# Patient Record
Sex: FEMALE | Race: WHITE | NOT HISPANIC OR LATINO | Employment: FULL TIME | URBAN - METROPOLITAN AREA
[De-identification: names, ages, dates, MRNs, and addresses within clinical notes are randomized per-mention and may not be internally consistent; named-entity substitution may affect disease eponyms.]

---

## 2017-01-19 ENCOUNTER — TELEPHONE (OUTPATIENT)
Dept: OBSTETRICS AND GYNECOLOGY | Facility: CLINIC | Age: 27
End: 2017-01-19

## 2017-01-19 DIAGNOSIS — R10.2 PELVIC PAIN IN FEMALE: Primary | ICD-10-CM

## 2017-01-19 NOTE — TELEPHONE ENCOUNTER
Emanuelmignondustin pt - pt had a mirena inserted 3 weeks ago and has been experiencing abdominal pain, she said it is a sharp pain more than cramping feeling and it is not consistent. It is on each side around her ovaries.

## 2017-01-19 NOTE — TELEPHONE ENCOUNTER
Pt reports sharp pelvic pain more on the right side but occurs on both sides.  Had Mirena inserted 3 weeks ago. She has not felt for the strings.  Scheduled US and appt with Dr. Castro. 1/24/17 @4280  US prep given.

## 2017-01-20 ENCOUNTER — TELEPHONE (OUTPATIENT)
Dept: SLEEP MEDICINE | Facility: CLINIC | Age: 27
End: 2017-01-20
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
## 2017-01-20 NOTE — TELEPHONE ENCOUNTER
----- Message from Bel Cobos sent at 1/18/2017  4:28 PM CST -----  _x  1st Request  _  2nd Request  _  3rd Request        Who: Patient     Why: Please call her she have questions about the botox injection.     What Number to Call Back: [phone]    When to Expect a call back: (Before the end of the day)   -- if call after 3:00 call back will be tomorrow.                      [phone]

## 2017-01-20 NOTE — TELEPHONE ENCOUNTER
I can assess for migraine and then treat migraine w Botox, but cannot treat TMJ w botox, technically.

## 2017-01-24 ENCOUNTER — OFFICE VISIT (OUTPATIENT)
Dept: OBSTETRICS AND GYNECOLOGY | Facility: CLINIC | Age: 27
End: 2017-01-24
Payer: COMMERCIAL

## 2017-01-24 VITALS
BODY MASS INDEX: 23.17 KG/M2 | WEIGHT: 125.88 LBS | SYSTOLIC BLOOD PRESSURE: 116 MMHG | DIASTOLIC BLOOD PRESSURE: 72 MMHG | HEIGHT: 62 IN

## 2017-01-24 DIAGNOSIS — Z30.431 IUD CHECK UP: ICD-10-CM

## 2017-01-24 DIAGNOSIS — R10.2 PELVIC PAIN: Primary | ICD-10-CM

## 2017-01-24 LAB
CANDIDA RRNA VAG QL PROBE: NEGATIVE
G VAGINALIS RRNA GENITAL QL PROBE: NEGATIVE
T VAGINALIS RRNA GENITAL QL PROBE: NEGATIVE

## 2017-01-24 PROCEDURE — 1159F MED LIST DOCD IN RCRD: CPT | Mod: S$GLB,,, | Performed by: OBSTETRICS & GYNECOLOGY

## 2017-01-24 PROCEDURE — 87591 N.GONORRHOEAE DNA AMP PROB: CPT

## 2017-01-24 PROCEDURE — 99213 OFFICE O/P EST LOW 20 MIN: CPT | Mod: S$GLB,,, | Performed by: OBSTETRICS & GYNECOLOGY

## 2017-01-24 PROCEDURE — 99999 PR PBB SHADOW E&M-EST. PATIENT-LVL III: CPT | Mod: PBBFAC,,, | Performed by: OBSTETRICS & GYNECOLOGY

## 2017-01-24 PROCEDURE — 87480 CANDIDA DNA DIR PROBE: CPT

## 2017-01-24 NOTE — PROGRESS NOTES
"2 weeks without pain. Thursday sharp pain in RLQ, Saturday the worst.  totally gone. Monday returned. Saturday dark mucous like discharge. No dysuria.       Chief Complaint: Pelvic Pain     HPI:      Aleksandra Alamo is a 26 y.o.  who presents complaining of 5 days of sharp right lower quadrant pain. Pain has been waxing and waning, with it's peak on Saturday. Patient thought pain was resolved on  but  evening it returned. She had some dark brown mucous -like discharge on Saturday, but no other abnormal discharge. No fevers or chills. No dysuria. No change in bowel or bladder habits. Aleksandra had an IUD placed on 16, and afterwards she had a few days of cramping, but then >2 weeks without pain prior to onset of this episode. She has taken advil with no relief, and naproxen with some relief. Patient's last menstrual period was 2016.      ROS:     GENERAL: Denies fevers or chills. Feeling well overall.   ABDOMEN: Denies abdominal pain, constipation, diarrhea, nausea, vomiting, change in appetite.   URINARY: Denies frequency, dysuria, hematuria.  GYNECOLOGIC: See HPI.  NEUROLOGIC: Denies syncope or weakness.     Physical Exam:      PHYSICAL EXAM:  Visit Vitals    /72    Ht 5' 2" (1.575 m)    Wt 57.1 kg (125 lb 14.1 oz)    LMP 2016    BMI 23.02 kg/m2     Body mass index is 23.02 kg/(m^2).     APPEARANCE: Well nourished, well developed, in no acute distress.  ABDOMEN: Soft.  No tenderness or masses.    PELVIC: Normal external genitalia without lesions.  Normal hair distribution.  Adequate perineal body, normal urethral meatus.  Vagina moist and well rugated without lesions or discharge. Small amount of dark blood within the vaginal vault. Cervix pink, without lesions, discharge or tenderness. IUD strings visible at the cervical os.  No significant cystocele or rectocele.  Bimanual exam shows uterus to be normal size, regular, mobile and nontender.  Adnexa without masses. " Bilateral adnexa minimally tender.   EXTREMITIES: No edema.     Results:      U/S performed in clinic today:   Uterus: 8.1 x 4.0 x 5.7 cm  There is an IUD identified in appropriate position within the endometrial cavity.  EMS: 7 mm.  No abnormal uterine masses are identified.  Right Ovary: 2.8 x 2.1 x 3.8 cm, normal.  Left Ovary: 2.9 x 2.3 x 2.9 cm, normal.  There is arterial and venous blood flow identified within both ovaries.  No free fluid is identified within the pelvis.    Assessment/Plan:     Pelvic pain  -     Vaginosis Screen by DNA Probe  -     C. trachomatis/N. gonorrhoeae by AMP DNA Cervicovaginal  -     Patient advised to let me know if pain increases or if it does not resolve within the next two weeks  -     NSAIDs recommended for pain    IUD check up       -      IUD in correct position       -      Reviewed efficacy range for Mirena IUD is 5 years

## 2017-01-26 LAB
C TRACH DNA SPEC QL NAA+PROBE: NEGATIVE
N GONORRHOEA DNA SPEC QL NAA+PROBE: NEGATIVE

## 2017-01-27 ENCOUNTER — TELEPHONE (OUTPATIENT)
Dept: OBSTETRICS AND GYNECOLOGY | Facility: CLINIC | Age: 27
End: 2017-01-27

## 2017-01-27 NOTE — TELEPHONE ENCOUNTER
Left message for patient that all vaginal swabs (GC/CT/Affirm) were negative for evidence of infection.

## 2018-02-21 ENCOUNTER — HOSPITAL ENCOUNTER (EMERGENCY)
Facility: OTHER | Age: 28
Discharge: HOME | End: 2018-02-21
Attending: EMERGENCY MEDICINE
Payer: COMMERCIAL

## 2018-02-21 VITALS
WEIGHT: 120 LBS | HEIGHT: 63 IN | OXYGEN SATURATION: 100 % | HEART RATE: 69 BPM | RESPIRATION RATE: 16 BRPM | SYSTOLIC BLOOD PRESSURE: 106 MMHG | BODY MASS INDEX: 21.26 KG/M2 | TEMPERATURE: 98 F | DIASTOLIC BLOOD PRESSURE: 64 MMHG

## 2018-02-21 DIAGNOSIS — S16.1XXA STRAIN OF NECK MUSCLE, INITIAL ENCOUNTER: Primary | ICD-10-CM

## 2018-02-21 DIAGNOSIS — M54.2 NECK PAIN ON LEFT SIDE: ICD-10-CM

## 2018-02-21 LAB
B-HCG UR QL: NEGATIVE
CTP QC/QA: YES

## 2018-02-21 PROCEDURE — 99284 EMERGENCY DEPT VISIT MOD MDM: CPT | Mod: 25

## 2018-02-21 PROCEDURE — 25000003 PHARM REV CODE 250: Performed by: PHYSICIAN ASSISTANT

## 2018-02-21 PROCEDURE — 96372 THER/PROPH/DIAG INJ SC/IM: CPT

## 2018-02-21 PROCEDURE — 81025 URINE PREGNANCY TEST: CPT | Performed by: PHYSICIAN ASSISTANT

## 2018-02-21 PROCEDURE — 63600175 PHARM REV CODE 636 W HCPCS: Performed by: PHYSICIAN ASSISTANT

## 2018-02-21 RX ORDER — KETOROLAC TROMETHAMINE 30 MG/ML
30 INJECTION, SOLUTION INTRAMUSCULAR; INTRAVENOUS
Status: COMPLETED | OUTPATIENT
Start: 2018-02-21 | End: 2018-02-21

## 2018-02-21 RX ORDER — ESCITALOPRAM OXALATE 10 MG/1
10 TABLET ORAL DAILY
COMMUNITY

## 2018-02-21 RX ORDER — CYCLOBENZAPRINE HCL 10 MG
10 TABLET ORAL 3 TIMES DAILY PRN
Qty: 30 TABLET | Refills: 0 | Status: SHIPPED | OUTPATIENT
Start: 2018-02-21 | End: 2018-02-26

## 2018-02-21 RX ORDER — IBUPROFEN 600 MG/1
600 TABLET ORAL EVERY 6 HOURS PRN
Qty: 20 TABLET | Refills: 0 | Status: SHIPPED | OUTPATIENT
Start: 2018-02-21

## 2018-02-21 RX ORDER — CYCLOBENZAPRINE HCL 10 MG
10 TABLET ORAL
Status: COMPLETED | OUTPATIENT
Start: 2018-02-21 | End: 2018-02-21

## 2018-02-21 RX ADMIN — CYCLOBENZAPRINE HYDROCHLORIDE 10 MG: 10 TABLET, FILM COATED ORAL at 11:02

## 2018-02-21 RX ADMIN — KETOROLAC TROMETHAMINE 30 MG: 30 INJECTION, SOLUTION INTRAMUSCULAR at 11:02

## 2018-02-21 NOTE — ED NOTES
Patient Identifiers for Aleksandra Alamo checked and correct  LOC: The patient is awake, alert and aware of environment with an appropriate affect, the patient is oriented x 3 and speaking appropriate.  APPEARANCE: Patient resting comfortably and in no acute distress. The patient is clean and well groomed. The patient's clothing is properly fastened.  SKIN: The skin is warm and dry. The patient has normal skin turgor and moist mucus membranes. No rashes or lesions upon observation. Skin Intact , no breakdown noted.  Musculoskeletal :  Normal range of motion noted. Moves all extremities well, neck pain and left shoulder pain  RESPIRATORY: Airway is open and patent, respirations are spontaneous, patient has a normal effort and rate. Breath sounds are clear & equal, bilaterally.  CARDIAC: Patient has a normal rate and rhythm, no peripheral edema noted, capillary refill < 3 seconds.   ABDOMEN: Soft and non tender to palpation, no distention observed. Bowels Sounds are WNL all quads.  PULSES: 2+ radial & pedal pulses, symmetrical in all extremities.  NEUROLOGIC: PERRL, Pupils 3mm and reacts briskly to light. Motor strength 5/5 all extremities.  The pt's facial expression is symmetrical, patient moving all extremities, normal sensation in all extremities when touched with a finger.The patient is awake, alert and cooperative with a calm affect, patient is aware of environment.      Will continue to monitor
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
## 2024-09-22 ENCOUNTER — OFFICE VISIT (OUTPATIENT)
Dept: URGENT CARE | Facility: CLINIC | Age: 34
End: 2024-09-22
Payer: COMMERCIAL

## 2024-09-22 VITALS
HEIGHT: 63 IN | TEMPERATURE: 99 F | OXYGEN SATURATION: 98 % | SYSTOLIC BLOOD PRESSURE: 112 MMHG | DIASTOLIC BLOOD PRESSURE: 69 MMHG | HEART RATE: 86 BPM | BODY MASS INDEX: 21.26 KG/M2 | RESPIRATION RATE: 18 BRPM | WEIGHT: 120 LBS

## 2024-09-22 DIAGNOSIS — S90.861A TICK BITE OF RIGHT FOOT, INITIAL ENCOUNTER: Primary | ICD-10-CM
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
## 2024-09-22 DIAGNOSIS — L03.115 CELLULITIS OF RIGHT LOWER EXTREMITY: ICD-10-CM

## 2024-09-22 PROCEDURE — 99213 OFFICE O/P EST LOW 20 MIN: CPT | Mod: S$GLB,,, | Performed by: FAMILY MEDICINE

## 2024-09-22 RX ORDER — FLUCONAZOLE 150 MG/1
150 TABLET ORAL
COMMUNITY

## 2024-09-22 RX ORDER — NAPROXEN 500 MG/1
500 TABLET ORAL 2 TIMES DAILY
COMMUNITY
Start: 2024-09-09

## 2024-09-22 RX ORDER — DOXYCYCLINE HYCLATE 100 MG
100 TABLET ORAL 2 TIMES DAILY
Qty: 20 TABLET | Refills: 0 | Status: SHIPPED | OUTPATIENT
Start: 2024-09-22 | End: 2024-10-02

## 2024-09-22 RX ORDER — LORATADINE 10 MG/1
10 TABLET ORAL DAILY
Qty: 14 TABLET | Refills: 0 | Status: SHIPPED | OUTPATIENT
Start: 2024-09-22 | End: 2024-10-06

## 2024-09-22 RX ORDER — TINIDAZOLE 500 MG/1
1000 TABLET ORAL
COMMUNITY
Start: 2024-05-21

## 2024-09-22 RX ORDER — TERCONAZOLE 4 MG/G
1 CREAM VAGINAL NIGHTLY
COMMUNITY
Start: 2024-03-29

## 2024-09-22 RX ORDER — METRONIDAZOLE 7.5 MG/G
1 GEL VAGINAL NIGHTLY
COMMUNITY
Start: 2024-04-23

## 2024-09-22 RX ORDER — METHYLPREDNISOLONE 4 MG/1
TABLET ORAL
COMMUNITY
Start: 2024-08-29

## 2024-09-22 RX ORDER — DESOXIMETASONE 0.5 MG/G
CREAM TOPICAL 2 TIMES DAILY
COMMUNITY
Start: 2024-04-16 | End: 2025-04-22

## 2024-09-22 NOTE — PROGRESS NOTES
"Subjective:      Patient ID: Aleksandra Alamo is a 34 y.o. female.    Vitals:  height is 5' 3" (1.6 m) and weight is 54.4 kg (120 lb). Her temperature is 98.6 °F (37 °C). Her blood pressure is 112/69 and her pulse is 86. Her respiration is 18 and oxygen saturation is 98%.     Chief Complaint: Rash (Bug bite with swelling - Entered by patient)    Pt presents complaints of a bug bite. Occurred yesterday. Pt states its warm to the touch and growing in size. Pt states she felt the bite and flicked the bug off.  States she is not sure about the back but look like a tick. Pain level 0. OTC benadryl, topical benadryl, ibuprofen taken with no relief.  Patient states he got the tetanus shot 2 years ago in [place].     Insect Bite  This is a new problem. The current episode started yesterday. The problem occurs constantly. The problem has been gradually worsening. Pertinent negatives include no anorexia, arthralgias, change in bowel habit, chest pain, chills, congestion, coughing or diaphoresis. Nothing aggravates the symptoms. She has tried acetaminophen, NSAIDs and ice for the symptoms. The treatment provided no relief.     Constitution: Negative for chills and sweating.   HENT:  Negative for congestion.    Cardiovascular:  Negative for chest pain.   Respiratory:  Negative for cough.    Musculoskeletal:  Negative for joint pain.   Skin:  Negative for erythema.      Objective:     Physical Exam   Constitutional: She is oriented to person, place, and time. She appears well-developed.  Non-toxic appearance. She does not appear ill. No distress. normal  HENT:   Head: Normocephalic and atraumatic. Head is without abrasion, without contusion and without laceration.   Ears:   Right Ear: Tympanic membrane, external ear and ear canal normal. no impacted cerumen  Left Ear: Tympanic membrane, external ear and ear canal normal. no impacted cerumen  Nose: Nose normal. No rhinorrhea.   Mouth/Throat: Oropharynx is clear and moist and mucous " membranes are normal. No posterior oropharyngeal erythema.   Eyes: Conjunctivae, EOM and lids are normal. Pupils are equal, round, and reactive to light.   Neck: Trachea normal and phonation normal. Neck supple.   Cardiovascular: Normal rate, regular rhythm and normal heart sounds.   No murmur heard.  Pulmonary/Chest: Effort normal and breath sounds normal. No stridor. No respiratory distress. She has no wheezes. She has no rhonchi. She has no rales.   Abdominal: She exhibits no distension. There is no abdominal tenderness.   Musculoskeletal: Normal range of motion.         General: Normal range of motion.   Neurological: She is alert and oriented to person, place, and time.   Skin: Skin is warm, dry, intact and not diaphoretic. Capillary refill takes less than 2 seconds. No abrasion, No burn, No erythema and No ecchymosis         Comments:   Right Foot:     +ve redness and mild swelling over dorsal aspect  +Ve warmth.  No significant tenderness.  ROM WNL   Neurovascular intact.      Psychiatric: Her speech is normal and behavior is normal. Judgment and thought content normal.   Nursing note and vitals reviewed.      Assessment:     1. Tick bite of right foot, initial encounter    2. Cellulitis of right lower extremity        Plan:   Discussed exam findings/results/diagnosis/plan with patient. Advised to f/u with PCP within 2-5 days. ER precautions given if symptoms get any worse. All questions answered. Patient verbally understood and agreed with treatment plan.  Educational materials and instructions regarding the visit diagnosis and management provided.     Tick bite of right foot, initial encounter    Cellulitis of right lower extremity    Other orders  -     doxycycline (VIBRA-TABS) 100 MG tablet; Take 1 tablet (100 mg total) by mouth 2 (two) times daily. for 10 days  Dispense: 20 tablet; Refill: 0  -     loratadine (CLARITIN) 10 mg tablet; Take 1 tablet (10 mg total) by mouth once daily. for 14 days  Dispense:  14 tablet; Refill: 0

## 2024-12-03 ENCOUNTER — OFFICE VISIT (OUTPATIENT)
Dept: URGENT CARE | Facility: CLINIC | Age: 34
End: 2024-12-03
Payer: COMMERCIAL

## 2024-12-03 VITALS
RESPIRATION RATE: 18 BRPM | HEART RATE: 103 BPM | DIASTOLIC BLOOD PRESSURE: 73 MMHG | HEIGHT: 63 IN | BODY MASS INDEX: 21.26 KG/M2 | WEIGHT: 120 LBS | OXYGEN SATURATION: 99 % | TEMPERATURE: 99 F | SYSTOLIC BLOOD PRESSURE: 105 MMHG

## 2024-12-03 DIAGNOSIS — R11.2 NAUSEA AND VOMITING, UNSPECIFIED VOMITING TYPE: ICD-10-CM

## 2024-12-03 DIAGNOSIS — K52.9 GASTROENTERITIS: Primary | ICD-10-CM

## 2024-12-03 PROCEDURE — 99213 OFFICE O/P EST LOW 20 MIN: CPT | Mod: S$GLB,,, | Performed by: NURSE PRACTITIONER

## 2024-12-03 PROCEDURE — S0119 ONDANSETRON 4 MG: HCPCS | Mod: S$GLB,,, | Performed by: NURSE PRACTITIONER

## 2024-12-03 RX ORDER — ONDANSETRON 4 MG/1
4 TABLET, ORALLY DISINTEGRATING ORAL
Status: COMPLETED | OUTPATIENT
Start: 2024-12-03 | End: 2024-12-03

## 2024-12-03 RX ORDER — DICYCLOMINE HYDROCHLORIDE 20 MG/1
20 TABLET ORAL
Status: COMPLETED | OUTPATIENT
Start: 2024-12-03 | End: 2024-12-03

## 2024-12-03 RX ORDER — DICYCLOMINE HYDROCHLORIDE 10 MG/1
10 CAPSULE ORAL 3 TIMES DAILY PRN
Qty: 20 CAPSULE | Refills: 0 | Status: SHIPPED | OUTPATIENT
Start: 2024-12-03

## 2024-12-03 RX ADMIN — ONDANSETRON 4 MG: 4 TABLET, ORALLY DISINTEGRATING ORAL at 06:12

## 2024-12-03 RX ADMIN — DICYCLOMINE HYDROCHLORIDE 20 MG: 20 TABLET ORAL at 06:12

## 2024-12-03 NOTE — PROGRESS NOTES
"Subjective:      Patient ID: Aleksandra Alamo is a 34 y.o. female.    Vitals:  height is 5' 3" (1.6 m) and weight is 54.4 kg (120 lb). Her oral temperature is 98.7 °F (37.1 °C). Her blood pressure is 105/73 and her pulse is 103. Her respiration is 18 and oxygen saturation is 99%.     Chief Complaint: Emesis    Pt is here with c/o severe abdominal cramps, diarrhea, and nausea.  Provider note begins below    Patient cooks vegan mac and cheese last night.  She ate today after he had been sitting out on the stove.  Started having nausea vomiting and diarrhea every hours since 7:00 a.m..  Last episode of diarrhea about an hour ago and last episode of vomiting 3 hours ago.  She had a [facility] visit with [facility] and was prescribed Zofran but she has not taken it yet.  She is tolerating electrolyte fluids.  Did not notice any blood or mucus in stools.  She reports having sweats.    Emesis   This is a new problem. The current episode started today. The problem has been unchanged. The emesis has an appearance of bile and stomach contents. Associated symptoms include abdominal pain, chills, diarrhea and sweats. Pertinent negatives include no chest pain, coughing or fever. Treatments tried: pedialyte.       Constitution: Positive for chills and sweating. Negative for fever.   Cardiovascular:  Negative for chest pain and sob on exertion.   Respiratory:  Negative for cough and shortness of breath.    Gastrointestinal:  Positive for abdominal pain, nausea, vomiting and diarrhea. Negative for bright red blood in stool, dark colored stools and rectal bleeding.      Objective:     Physical Exam   Constitutional: She is oriented to person, place, and time.   HENT:   Head: Normocephalic and atraumatic.   Cardiovascular: Normal rate.   Pulmonary/Chest: Effort normal. No respiratory distress.   Abdominal: Normal appearance. She exhibits no distension. Soft. There is generalized abdominal tenderness.   Neurological: She is alert and oriented to " person, place, and time.   Skin: Skin is warm and dry.   Psychiatric: Her behavior is normal. Mood normal.       Assessment:     1. Gastroenteritis    2. Nausea and vomiting, unspecified vomiting type        Plan:   Zofran given in clinic   Bentyl given in clinic   Drink plenty of electrolytes and fluids.  Avoid diary products, spicy foods, and greasy foods.  [place] diet.  Go to ER if not urinating or noted blood in stool.     ED precautions  Has zofran        Gastroenteritis  -     ondansetron disintegrating tablet 4 mg  -     dicyclomine tablet 20 mg  -     dicyclomine (BENTYL) 10 MG capsule; Take 1 capsule (10 mg total) by mouth 3 (three) times daily as needed (abd cramp).  Dispense: 20 capsule; Refill: 0    Nausea and vomiting, unspecified vomiting type  -     ondansetron disintegrating tablet 4 mg  -     dicyclomine tablet 20 mg  -     dicyclomine (BENTYL) 10 MG capsule; Take 1 capsule (10 mg total) by mouth 3 (three) times daily as needed (abd cramp).  Dispense: 20 capsule; Refill: 0